# Patient Record
Sex: FEMALE | Race: WHITE | NOT HISPANIC OR LATINO | Employment: STUDENT | URBAN - METROPOLITAN AREA
[De-identification: names, ages, dates, MRNs, and addresses within clinical notes are randomized per-mention and may not be internally consistent; named-entity substitution may affect disease eponyms.]

---
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
## 2024-01-09 ENCOUNTER — OFFICE VISIT (OUTPATIENT)
Dept: OBSTETRICS AND GYNECOLOGY | Facility: CLINIC | Age: 19
End: 2024-01-09
Payer: COMMERCIAL

## 2024-01-09 VITALS — DIASTOLIC BLOOD PRESSURE: 72 MMHG | SYSTOLIC BLOOD PRESSURE: 106 MMHG | WEIGHT: 173 LBS | HEART RATE: 103 BPM

## 2024-01-09 DIAGNOSIS — Z30.9 ENCOUNTER FOR CONTRACEPTIVE MANAGEMENT, UNSPECIFIED TYPE: Primary | ICD-10-CM

## 2024-01-09 PROCEDURE — 1159F MED LIST DOCD IN RCRD: CPT | Mod: CPTII,S$GLB,,

## 2024-01-09 PROCEDURE — 3078F DIAST BP <80 MM HG: CPT | Mod: CPTII,S$GLB,,

## 2024-01-09 PROCEDURE — 3074F SYST BP LT 130 MM HG: CPT | Mod: CPTII,S$GLB,,

## 2024-01-09 PROCEDURE — 99212 OFFICE O/P EST SF 10 MIN: CPT | Mod: S$GLB,,,

## 2024-01-09 RX ORDER — DROSPIRENONE 4 MG/1
4 TABLET, FILM COATED ORAL DAILY
Qty: 84 TABLET | Refills: 3 | Status: SHIPPED | OUTPATIENT
Start: 2024-01-09

## 2024-01-09 NOTE — PROGRESS NOTES
Subjective:      Patient ID: Ofelia Cavazos is a 18 y.o. female who presents for evaluation today.    Chief Complaint:    Med check (Pt thinks birth control is working well. Her periods have been pretty normal. She has noticed weight gain. She doesn't love the birth control, but doesn't hate it. She does notice a little bit of a difference in her mood. But not as bad as when she was on Loloestrin.)      History of Present Illness  HPI She is here for a 3 month med check since nexstellis. She has noticed some weight gain & slight mood changes. She notes feeling better than when she was on loloestrin but still having them occasionally. Her periods are regular & manageable. She is good about taking her pill everyday. We discussed alternative options, POPs, nuvaring, progesterone IUDs.     GYN History  No LMP recorded (within weeks). (Menstrual status: Birth Control).   Date of Last Pap: N/A    VITALS  /72   Pulse 103   Wt 78.5 kg (173 lb)   LMP  (Within Weeks)   Weight: 78.5 kg (173 lb)         PAST MEDICAL HISTORY  Past Medical History:   Diagnosis Date    Heart murmur        PAST SURGICAL HISTORY  History reviewed. No pertinent surgical history.    SOCIAL HISTORY  Social History     Tobacco Use   Smoking Status Never   Smokeless Tobacco Never   ,   Social History     Substance and Sexual Activity   Alcohol Use Yes        MEDICATIONS  Outpatient Medications Marked as Taking for the 1/9/24 encounter (Office Visit) with Roslyn Walsh NP   Medication Sig Dispense Refill    [DISCONTINUED] NEXTSTELLIS 3 mg- 14.2 mg (28) Tab Take 1 tablet by mouth once daily. 84 tablet 3         Review of Systems   Review of Systems   Constitutional:  Negative for activity change.   Eyes:  Negative for visual disturbance.   Respiratory:  Negative for shortness of breath.    Cardiovascular:  Negative for chest pain.   Gastrointestinal:  Negative for abdominal pain.   Genitourinary:  Negative for vaginal bleeding.         No abnormal vaginal bleeding   Musculoskeletal:  Negative for back pain.   Integumentary:  Negative for rash and breast mass.   Neurological:  Negative for numbness.   Psychiatric/Behavioral:          No mood disturbance or changes    Breast: Negative for mass          Objective:     Physical Exam:   Constitutional: She is oriented to person, place, and time. No distress.       Cardiovascular:  Normal rate.             Pulmonary/Chest: Effort normal. No respiratory distress.        Abdominal: Soft. She exhibits no distension. There is no abdominal tenderness.             Musculoskeletal: Moves all extremeties.       Neurological: She is alert and oriented to person, place, and time.    Skin: Skin is warm and dry.    Psychiatric: She has a normal mood and affect. Her behavior is normal.          Assessment:        1. Encounter for contraceptive management, unspecified type       Encounter for contraceptive management, unspecified type  -     drospirenone, contraceptive, (SLYND) 4 mg (28) Tab; Take 1 tablet (4 mg total) by mouth once daily.  Dispense: 84 tablet; Refill: 3         Plan:     Patient instructed to contact the clinic should any questions or concerns arise prior to her next office visit. Patient is happy with the plan of care at this time, verbalizes understanding and denies outstanding questions.      Birth Control options & safe sex practices discussed  She will decide about Kyleena IUD, will try slynd for now, f/u in 3 mos  If you don't hear from the office regarding results within 1 week, please call  Follow up in 1 year for annual or sooner as needed  Chaperone present for exam      The patient verbalizes desire to continue with current contraceptive regimen. The risks associated with oral contraceptive use, with special emphasis placed on the risk for stroke, blood clots, MI and death were reviewed with the patient. The patient was provided with the opportunity to ask questions and verbalize  concerns. Patient education was provided on the following aspects associated with oral contraceptive use:  Take the pill at the same time every day, set an alarm to serve as a reminder if necessary   If nausea or vomiting occurs when taking the medication, take with food or at bedtime  Missing doses may lead to breakthrough bleeding   Your periods may become lighter and/or shorter in duration than usual  If a pill is missed for one day, take 2 pills the next day  If two consecutive days are missed, take 2 pills for the next 2 days to catch up and finish the birth control pack. Utilize a backup method of contraception for the remainder of the pill pack.  Certain medications, especially antibiotics, may decrease the effectiveness of the pill  Oral contraceptives do not provide protection against sexually transmitted diseases  If any of the following symptoms are experiencing report to the nearest emergency room for further evaluation and discontinue the pill immediately.  Severe abdominal pain  Severe chest pain  Severe headache or paresthesia   Changes in vision  Severe leg pain or swelling  Shortness of breath and increased heart rate   Discussed by taking the pill, the patient consents to knowing and understanding the risks associated with oral contraceptive therapy. The patient verbalizes understanding of the components discussed and denies additional questions at this time.

## 2024-02-15 ENCOUNTER — PATIENT MESSAGE (OUTPATIENT)
Dept: OBSTETRICS AND GYNECOLOGY | Facility: CLINIC | Age: 19
End: 2024-02-15
Payer: COMMERCIAL

## 2024-02-16 ENCOUNTER — TELEPHONE (OUTPATIENT)
Dept: OBSTETRICS AND GYNECOLOGY | Facility: CLINIC | Age: 19
End: 2024-02-16
Payer: COMMERCIAL

## 2024-02-16 DIAGNOSIS — Z30.014 ENCOUNTER FOR INITIAL PRESCRIPTION OF INTRAUTERINE CONTRACEPTIVE DEVICE (IUD): Primary | ICD-10-CM

## 2024-02-16 NOTE — TELEPHONE ENCOUNTER
Pt is interested in getting an IUD. She and her mom have some further questions before deciding which IUD she would like to get. She is going to school in [place] so wanted to see if Roslyn would speak with her over the phone instead of having to come in for a visit.

## 2024-02-16 NOTE — TELEPHONE ENCOUNTER
Phone call to pt, she is interested in moving forward with Kyleena IUD for birth control methods. She is currently on slynd OCP. We discussed continuing this pill until her IUD insertion. Explained this is the smaller progesterone only IUD, well suited for nulliparous women, is good for 5 years but can be removed at anytime. This typically will lighten her period or may not have one at all, which can be normal. I will send in her order and the office will call when it is approved & get her scheduled for placement with Dr. Thorpe. All questions addressed, she verbalized understanding.

## 2024-02-27 ENCOUNTER — PATIENT MESSAGE (OUTPATIENT)
Dept: OBSTETRICS AND GYNECOLOGY | Facility: CLINIC | Age: 19
End: 2024-02-27
Payer: COMMERCIAL

## 2024-03-01 DIAGNOSIS — Z30.430 ENCOUNTER FOR IUD INSERTION: Primary | ICD-10-CM

## 2024-03-01 RX ORDER — MISOPROSTOL 200 UG/1
TABLET ORAL
Qty: 9 TABLET | Refills: 0 | Status: SHIPPED | OUTPATIENT
Start: 2024-03-01 | End: 2024-06-17

## 2024-03-22 ENCOUNTER — PATIENT MESSAGE (OUTPATIENT)
Dept: OBSTETRICS AND GYNECOLOGY | Facility: CLINIC | Age: 19
End: 2024-03-22

## 2024-03-22 ENCOUNTER — PROCEDURE VISIT (OUTPATIENT)
Dept: OBSTETRICS AND GYNECOLOGY | Facility: CLINIC | Age: 19
End: 2024-03-22
Payer: COMMERCIAL

## 2024-03-22 VITALS
BODY MASS INDEX: 26.68 KG/M2 | HEIGHT: 67 IN | DIASTOLIC BLOOD PRESSURE: 74 MMHG | HEART RATE: 91 BPM | WEIGHT: 170 LBS | SYSTOLIC BLOOD PRESSURE: 103 MMHG

## 2024-03-22 DIAGNOSIS — Z30.430 ENCOUNTER FOR IUD INSERTION: Primary | ICD-10-CM

## 2024-03-22 DIAGNOSIS — Z30.9 ENCOUNTER FOR CONTRACEPTIVE MANAGEMENT, UNSPECIFIED TYPE: ICD-10-CM

## 2024-03-22 PROCEDURE — 58300 INSERT INTRAUTERINE DEVICE: CPT | Mod: S$GLB,,, | Performed by: OBSTETRICS & GYNECOLOGY

## 2024-03-22 NOTE — PROCEDURES
Insertion of IUD    Date/Time: 3/22/2024 10:15 AM    Performed by: Quinten Thorpe III, MD  Authorized by: Quinten Thorpe III, MD    Consent:     Procedure risks and benefits discussed: yes      Patient questions answered: yes      Patient agrees, verbalizes understanding, and wants to proceed: yes     Device to be inserted was verified by patient: yes    Educational handouts given: yes      Instructions and paperwork completed: yes    Insertion Procedure:   17.5 mcg levonorgestreL 17.5 mcg/24 hrs (5 yrs) 19.5 mg       Pelvic exam performed: yes      Negative GC/chlamydia test: yes      Negative urine pregnancy test: yes      Cervix cleaned and prepped: yes      Speculum placed in vagina: yes      Uterus sounded: yes      Uterus sound depth (cm):  7    IUD inserted with no complications: yes      IUD type:  Kyleena    Strings trimmed: yes    Post-procedure:     Patient tolerated procedure well: yes      Patient will follow up after next period: yes    Comments:      [facility] VERY WELL

## 2024-03-26 ENCOUNTER — TELEPHONE (OUTPATIENT)
Dept: OBSTETRICS AND GYNECOLOGY | Facility: CLINIC | Age: 19
End: 2024-03-26
Payer: COMMERCIAL

## 2024-05-13 ENCOUNTER — TELEPHONE (OUTPATIENT)
Dept: OBSTETRICS AND GYNECOLOGY | Facility: CLINIC | Age: 19
End: 2024-05-13

## 2024-05-13 ENCOUNTER — PROCEDURE VISIT (OUTPATIENT)
Dept: OBSTETRICS AND GYNECOLOGY | Facility: CLINIC | Age: 19
End: 2024-05-13
Payer: COMMERCIAL

## 2024-05-13 ENCOUNTER — OFFICE VISIT (OUTPATIENT)
Dept: OBSTETRICS AND GYNECOLOGY | Facility: CLINIC | Age: 19
End: 2024-05-13
Payer: COMMERCIAL

## 2024-05-13 VITALS
BODY MASS INDEX: 26.91 KG/M2 | DIASTOLIC BLOOD PRESSURE: 77 MMHG | HEART RATE: 73 BPM | WEIGHT: 171.81 LBS | SYSTOLIC BLOOD PRESSURE: 118 MMHG

## 2024-05-13 DIAGNOSIS — Z30.431 IUD CHECK UP: Primary | ICD-10-CM

## 2024-05-13 DIAGNOSIS — Z30.431 IUD CHECK UP: ICD-10-CM

## 2024-05-13 PROCEDURE — 76830 TRANSVAGINAL US NON-OB: CPT | Mod: S$GLB,,, | Performed by: OBSTETRICS & GYNECOLOGY

## 2024-05-13 PROCEDURE — 3078F DIAST BP <80 MM HG: CPT | Mod: CPTII,S$GLB,,

## 2024-05-13 PROCEDURE — 99212 OFFICE O/P EST SF 10 MIN: CPT | Mod: 25,S$GLB,,

## 2024-05-13 PROCEDURE — 1159F MED LIST DOCD IN RCRD: CPT | Mod: CPTII,S$GLB,,

## 2024-05-13 PROCEDURE — 1160F RVW MEDS BY RX/DR IN RCRD: CPT | Mod: CPTII,S$GLB,,

## 2024-05-13 PROCEDURE — 99459 PELVIC EXAMINATION: CPT | Mod: S$GLB,,,

## 2024-05-13 PROCEDURE — 3008F BODY MASS INDEX DOCD: CPT | Mod: CPTII,S$GLB,,

## 2024-05-13 PROCEDURE — 3074F SYST BP LT 130 MM HG: CPT | Mod: CPTII,S$GLB,,

## 2024-05-13 NOTE — PROGRESS NOTES
Subjective:      Patient ID: Ofelia Cavazos is a 18 y.o. female who presents for evaluation today.    Chief Complaint:    IUD Check (Pt states that she is still bleeding from the insertion, but is doing well.)      History of Present Illness  HPI She reports for 6 week IUD check. She has done well since kyleena insertion. Reports cramping has improved but irregular spotting still present. She denies pelvic pain. No further side effects.    GYN History  No LMP recorded (lmp unknown). (Menstrual status: Birth Control).   Date of Last Pap: N/A    VITALS  /77   Pulse 73   Wt 77.9 kg (171 lb 12.8 oz)   LMP  (LMP Unknown)   BMI 26.91 kg/m²   Weight: 77.9 kg (171 lb 12.8 oz)         PAST MEDICAL HISTORY  Past Medical History:   Diagnosis Date    Heart murmur        PAST SURGICAL HISTORY  History reviewed. No pertinent surgical history.    SOCIAL HISTORY  Social History     Tobacco Use   Smoking Status Never   Smokeless Tobacco Never   ,   Social History     Substance and Sexual Activity   Alcohol Use Yes        MEDICATIONS  No outpatient medications have been marked as taking for the 5/13/24 encounter (Office Visit) with Roslyn Walsh NP.     Current Facility-Administered Medications for the 5/13/24 encounter (Office Visit) with Roslyn Walsh NP   Medication Dose Route Frequency Provider Last Rate Last Admin    levonorgestreL (KYLEENA) 17.5 mcg/24 hrs (5 yrs) 19.5 mg IUD 17.5 mcg  17.5 mcg Intrauterine  Quinten Thorpe III, MD   17.5 mcg at 03/22/24 1015         Review of Systems   Review of Systems   Constitutional:  Negative for activity change.   Eyes:  Negative for visual disturbance.   Respiratory:  Negative for shortness of breath.    Cardiovascular:  Negative for chest pain.   Gastrointestinal:  Negative for abdominal pain.   Genitourinary:  Negative for vaginal bleeding.        No abnormal vaginal bleeding   Musculoskeletal:  Negative for back pain.   Integumentary:  Negative for rash  and breast mass.   Neurological:  Negative for numbness.   Psychiatric/Behavioral:          No mood disturbance or changes    Breast: Negative for mass          Objective:     Physical Exam:   Constitutional: She appears well-developed.               Genitourinary:    Vagina and uterus normal.      Pelvic exam was performed with patient supine.   The external female genitalia was normal.     Labial bartholins normal.There is no tenderness or lesion on the right labia. There is no tenderness or lesion on the left labia. Cervix is normal. Right adnexum displays no tenderness and no fullness. Left adnexum displays no tenderness and no fullness. Bleeding: menstrual. Uterus is not enlarged and not tender. Unable to visualize IUD strings.              Neurological: She is alert.            Assessment:        1. IUD check up         Plan:     Patient instructed to contact the clinic should any questions or concerns arise prior to her next office visit. Patient is happy with the plan of care at this time, verbalizes understanding and denies outstanding questions.      Pt sent for pelvic ultrasound to confirm IUD placement  Birth Control & safe sex discussed  Offered low does OCP to help with spotting  If you don't hear from the office regarding results within 1 week, please call  Follow up for annual or sooner as needed  Chaperone present for exam

## 2024-06-17 ENCOUNTER — OFFICE VISIT (OUTPATIENT)
Dept: OBSTETRICS AND GYNECOLOGY | Facility: CLINIC | Age: 19
End: 2024-06-17
Payer: COMMERCIAL

## 2024-06-17 VITALS
SYSTOLIC BLOOD PRESSURE: 115 MMHG | WEIGHT: 171.38 LBS | BODY MASS INDEX: 26.85 KG/M2 | HEART RATE: 89 BPM | DIASTOLIC BLOOD PRESSURE: 78 MMHG

## 2024-06-17 DIAGNOSIS — Z01.419 WELL WOMAN EXAM WITH ROUTINE GYNECOLOGICAL EXAM: Primary | ICD-10-CM

## 2024-06-17 DIAGNOSIS — Z11.3 ENCOUNTER FOR SCREENING FOR INFECTIONS WITH PREDOMINANTLY SEXUAL MODE OF TRANSMISSION: ICD-10-CM

## 2024-06-17 PROCEDURE — 1159F MED LIST DOCD IN RCRD: CPT | Mod: CPTII,S$GLB,,

## 2024-06-17 PROCEDURE — 3078F DIAST BP <80 MM HG: CPT | Mod: CPTII,S$GLB,,

## 2024-06-17 PROCEDURE — 1160F RVW MEDS BY RX/DR IN RCRD: CPT | Mod: CPTII,S$GLB,,

## 2024-06-17 PROCEDURE — 3074F SYST BP LT 130 MM HG: CPT | Mod: CPTII,S$GLB,,

## 2024-06-17 PROCEDURE — 99395 PREV VISIT EST AGE 18-39: CPT | Mod: S$GLB,,,

## 2024-06-17 PROCEDURE — 3008F BODY MASS INDEX DOCD: CPT | Mod: CPTII,S$GLB,,

## 2024-06-17 PROCEDURE — 99459 PELVIC EXAMINATION: CPT | Mod: S$GLB,,,

## 2024-06-17 NOTE — PROGRESS NOTES
Subjective:      Patient ID: Ofelia Cavazos is a 18 y.o. female who presents for evaluation today.    Chief Complaint:    Well Woman      History of Present Illness  HPI  Annual Exam (Premenopausal) - Patient presents for annual exam. The patient has no complaints today. The patient is sexually active. GYN screening history: no prior history of gyn screening tests. The patient wears seatbelts: yes. The patient participates in regular exercise: yes. Has the patient ever been transfused or tattooed?: not asked. The patient reports that there is not domestic violence in her life. She has done well with her Kyleena, reports occasional spotting. She denies GI or  problems.    GYN History  No LMP recorded (lmp unknown). (Menstrual status: Birth Control).   Date of Last Pap: N/A    VITALS  /78   Pulse 89   Wt 77.7 kg (171 lb 6.4 oz)   LMP  (LMP Unknown)   BMI 26.85 kg/m²   Weight: 77.7 kg (171 lb 6.4 oz)         PAST MEDICAL HISTORY  Past Medical History:   Diagnosis Date    Heart murmur        PAST SURGICAL HISTORY  History reviewed. No pertinent surgical history.    SOCIAL HISTORY  Social History     Tobacco Use   Smoking Status Never   Smokeless Tobacco Never   ,   Social History     Substance and Sexual Activity   Alcohol Use Yes        MEDICATIONS  No outpatient medications have been marked as taking for the 6/17/24 encounter (Office Visit) with Roslyn Walsh NP.     Current Facility-Administered Medications for the 6/17/24 encounter (Office Visit) with Roslyn Walsh NP   Medication Dose Route Frequency Provider Last Rate Last Admin    levonorgestreL (KYLEENA) 17.5 mcg/24 hrs (5 yrs) 19.5 mg IUD 17.5 mcg  17.5 mcg Intrauterine  Quinten Thorpe III, MD   17.5 mcg at 03/22/24 1015         Review of Systems   Review of Systems   Constitutional:  Negative for activity change.   Eyes:  Negative for visual disturbance.   Respiratory:  Negative for shortness of breath.    Cardiovascular:   Negative for chest pain.   Gastrointestinal:  Negative for abdominal pain.   Genitourinary:  Negative for vaginal bleeding.        No abnormal vaginal bleeding   Musculoskeletal:  Negative for back pain.   Integumentary:  Negative for rash and breast mass.   Neurological:  Negative for numbness.   Psychiatric/Behavioral:          No mood disturbance or changes    Breast: Negative for mass          Objective:     Physical Exam:   Constitutional: She is oriented to person, place, and time. She appears well-developed. She is cooperative.    HENT:   Head: Normocephalic.     Neck: Trachea normal. No thyromegaly present.    Cardiovascular:  Normal rate, regular rhythm and normal heart sounds.             Pulmonary/Chest: Effort normal and breath sounds normal.        Abdominal: Soft. There is no abdominal tenderness. There is no rebound and no guarding.     Genitourinary:    Vagina and uterus normal.      Pelvic exam was performed with patient supine.   The external female genitalia was normal.     Labial bartholins normal.There is no lesion on the right labia. There is no lesion on the left labia. Cervix is normal. Right adnexum displays no mass and no tenderness. Left adnexum displays no mass and no tenderness. Cervix exhibits no discharge. Uterus is not enlarged and not tender. IUD strings visualized.             Lymphadenopathy:        Head (right side): No submental and no submandibular adenopathy present.        Head (left side): No submental and no submandibular adenopathy present.     She has no cervical adenopathy.    Neurological: She is alert and oriented to person, place, and time.    Skin: Skin is warm.    Psychiatric: She has a normal mood and affect. Her speech is normal and behavior is normal. Thought content normal.          Assessment:        1. Well woman exam with routine gynecological exam    2. Encounter for screening for infections with predominantly sexual mode of transmission       Well woman exam  with routine gynecological exam    Encounter for screening for infections with predominantly sexual mode of transmission  -     C. trachomatis/N. gonorrhoeae by AMP DNA Other; Cervicovaginal         Plan:     Patient instructed to contact the clinic should any questions or concerns arise prior to her next office visit. Patient is happy with the plan of care at this time, verbalizes understanding and denies outstanding questions.      Self-breast exams  Birth Control & safe sex discussed  If you don't hear from the office regarding results within 1 week, please call  Follow up in 1 year for annual or sooner as needed  Chaperone present for exam

## 2024-06-19 LAB
CHLAMYDIA: NEGATIVE
GONORRHEA: NEGATIVE
SOURCE: NORMAL

## 2025-07-29 ENCOUNTER — OFFICE VISIT (OUTPATIENT)
Dept: OBSTETRICS AND GYNECOLOGY | Facility: CLINIC | Age: 20
End: 2025-07-29
Payer: COMMERCIAL

## 2025-07-29 VITALS
BODY MASS INDEX: 26.37 KG/M2 | WEIGHT: 168 LBS | HEIGHT: 67 IN | DIASTOLIC BLOOD PRESSURE: 75 MMHG | SYSTOLIC BLOOD PRESSURE: 116 MMHG | HEART RATE: 77 BPM

## 2025-07-29 DIAGNOSIS — Z11.3 ENCOUNTER FOR SCREENING FOR INFECTIONS WITH PREDOMINANTLY SEXUAL MODE OF TRANSMISSION: ICD-10-CM

## 2025-07-29 DIAGNOSIS — Z01.419 WELL WOMAN EXAM WITH ROUTINE GYNECOLOGICAL EXAM: Primary | ICD-10-CM

## 2025-07-29 DIAGNOSIS — Z30.431 IUD CHECK UP: ICD-10-CM

## 2025-07-29 PROCEDURE — 99395 PREV VISIT EST AGE 18-39: CPT | Mod: S$PBB,,,

## 2025-07-29 PROCEDURE — 3008F BODY MASS INDEX DOCD: CPT | Mod: CPTII,,,

## 2025-07-29 PROCEDURE — 3074F SYST BP LT 130 MM HG: CPT | Mod: CPTII,,,

## 2025-07-29 PROCEDURE — 3078F DIAST BP <80 MM HG: CPT | Mod: CPTII,,,

## 2025-07-29 PROCEDURE — 1159F MED LIST DOCD IN RCRD: CPT | Mod: CPTII,,,

## 2025-07-29 PROCEDURE — 1160F RVW MEDS BY RX/DR IN RCRD: CPT | Mod: CPTII,,,

## 2025-07-29 RX ORDER — FLUOXETINE 20 MG/1
20 CAPSULE ORAL DAILY
COMMUNITY

## 2025-07-29 NOTE — PROGRESS NOTES
"  Subjective:      Patient ID: Ofelia Cavazos is a 19 y.o. female who presents for evaluation today.    Chief Complaint:    Well Woman      History of Present Illness  HPI  Annual Exam (Premenopausal) - Patient presents for annual exam. The patient has no complaints today. The patient is sexually active. GYN screening history: no prior history of gyn screening tests. The patient wears seatbelts: yes. The patient participates in regular exercise: yes. Has the patient ever been transfused or tattooed?: not asked. The patient reports that there is not domestic violence in her life. She is doing well, will be ch[facility]ra at [facility]. She has kyleena IUD, no periods with this. She denies GI or  problems. She is doing well on prozac.     GYN History  No LMP recorded (lmp unknown). Patient has had an implant.   Date of Last Pap: Up to date in accordance with [facility]P guidelines Pap smear schedule reviewed with patient Result history reviewed with patient Pap smear not performed    VITALS  /75 (BP Location: Right arm, Patient Position: Sitting)   Pulse 77   Ht 5' 7" (1.702 m)   Wt 76.2 kg (168 lb)   LMP  (LMP Unknown)   BMI 26.31 kg/m²   Weight: 76.2 kg (168 lb)   Height: 5' 7" (170.2 cm)     PAST MEDICAL HISTORY  Past Medical History:   Diagnosis Date    Heart murmur        PAST SURGICAL HISTORY  History reviewed. No pertinent surgical history.    SOCIAL HISTORY  Tobacco Use History[1],   Social History     Substance and Sexual Activity   Alcohol Use Yes        MEDICATIONS  No outpatient medications have been marked as taking for the 7/29/25 encounter (Office Visit) with Roslyn Walsh NP.     Current Facility-Administered Medications for the 7/29/25 encounter (Office Visit) with Roslyn Walsh NP   Medication Dose Route Frequency Provider Last Rate Last Admin    levonorgestreL (KYLEENA) 17.5 mcg/24 hrs (5 yrs) 19.5 mg IUD 17.5 mcg  17.5 mcg Intrauterine  Quinten Thorpe III, MD   17.5 mcg at 03/22/24 1015 "         Review of Systems   Review of Systems   Constitutional:  Negative for activity change.   Eyes:  Negative for visual disturbance.   Respiratory:  Negative for shortness of breath.    Cardiovascular:  Negative for chest pain.   Gastrointestinal:  Negative for abdominal pain.   Genitourinary:  Negative for vaginal bleeding.        No abnormal vaginal bleeding   Musculoskeletal:  Negative for back pain.   Integumentary:  Negative for rash and breast mass.   Neurological:  Negative for numbness.   Psychiatric/Behavioral:          No mood disturbance or changes    Breast: Negative for mass          Objective:     Physical Exam:   Constitutional: She is oriented to person, place, and time. Vital signs are normal. No distress.       Cardiovascular:  Normal rate and regular rhythm.             Pulmonary/Chest: Effort normal and breath sounds normal. No respiratory distress.        Abdominal: Soft. She exhibits no distension. There is no abdominal tenderness.     Genitourinary:    Vagina and uterus normal.      Pelvic exam was performed with patient in the lithotomy position.   The external female genitalia was normal.     Labial bartholins normal.There is no rash or tenderness on the right labia. There is no rash or tenderness on the left labia. Cervix is normal. Right adnexum displays no tenderness and no fullness. Left adnexum displays no tenderness and no fullness. No vaginal discharge or bleeding in the vagina. Cervix exhibits no discharge and no friability. Uterus is not enlarged. Unable to visualize IUD strings.          Musculoskeletal: Moves all extremeties.       Neurological: She is alert and oriented to person, place, and time.    Skin: Skin is warm and dry.    Psychiatric: She has a normal mood and affect. Her behavior is normal.          Assessment:     Well woman exam with routine gynecological exam    Encounter for screening for infections with predominantly sexual mode of transmission  -     C.  trachomatis/N. gonorrhoeae by AMP DNA Other; Cervicovaginal    IUD check up  -     US OB/GYN Procedure ([facility]); Future         Plan:     Patient instructed to contact the clinic should any questions or concerns arise prior to her next office visit. Patient is happy with the plan of care at this time, verbalizes understanding and denies outstanding questions.      Self-breast exams  Birth Control discussed  US scheduled to verify IUD  If you don't hear from the office regarding results within 1 week, please call  Follow up in 1 year for annual or sooner as needed  Chaperone present for exam   Female chaperone present.        [1]   Social History  Tobacco Use   Smoking Status Never   Smokeless Tobacco Never

## 2025-07-30 ENCOUNTER — PROCEDURE VISIT (OUTPATIENT)
Dept: OBSTETRICS AND GYNECOLOGY | Facility: CLINIC | Age: 20
End: 2025-07-30
Payer: COMMERCIAL

## 2025-07-30 DIAGNOSIS — Z30.431 IUD CHECK UP: ICD-10-CM

## 2025-07-30 PROCEDURE — 76830 TRANSVAGINAL US NON-OB: CPT | Mod: 26,S$PBB,, | Performed by: OBSTETRICS & GYNECOLOGY

## 2025-07-31 LAB
CHLAMYDIA: NEGATIVE
GONORRHEA: NEGATIVE
SOURCE: NORMAL